# Patient Record
Sex: MALE | URBAN - METROPOLITAN AREA
[De-identification: names, ages, dates, MRNs, and addresses within clinical notes are randomized per-mention and may not be internally consistent; named-entity substitution may affect disease eponyms.]

---

## 2020-08-05 ENCOUNTER — OFFICE VISIT (OUTPATIENT)
Dept: URBAN - METROPOLITAN AREA CLINIC 72 | Facility: CLINIC | Age: 65
End: 2020-08-05
Payer: MEDICARE

## 2020-08-05 DIAGNOSIS — Z96.1 PRESENCE OF INTRAOCULAR LENS: ICD-10-CM

## 2020-08-05 DIAGNOSIS — H43.813 VITREOUS DEGENERATION, BILATERAL: ICD-10-CM

## 2020-08-05 DIAGNOSIS — H25.12 AGE-RELATED NUCLEAR CATARACT, LEFT EYE: Primary | ICD-10-CM

## 2020-08-05 PROCEDURE — 92014 COMPRE OPH EXAM EST PT 1/>: CPT | Performed by: OPTOMETRIST

## 2020-08-05 PROCEDURE — 92133 CPTRZD OPH DX IMG PST SGM ON: CPT | Performed by: OPTOMETRIST

## 2020-08-05 PROCEDURE — 92134 CPTRZ OPH DX IMG PST SGM RTA: CPT | Performed by: OPTOMETRIST

## 2020-08-05 ASSESSMENT — VISUAL ACUITY
OD: 20/20
OS: 20/20

## 2020-08-05 ASSESSMENT — INTRAOCULAR PRESSURE
OS: 9
OD: 7

## 2020-08-05 NOTE — IMPRESSION/PLAN
Impression: Vitreous degeneration, bilateral: H43.813. Plan: The PVD is stable, and there is no evidence of a retinal tear or detachment on dilated exam.  I again reviewed the signs and symptoms of a retinal tear and detachment in detail with the patient, including worsening flashes, new floaters, and development of a shadow/curtain in the peripheral visual field. The patient was advised to call immediately with any changes to [address] or increase in symptoms.

## 2020-08-05 NOTE — IMPRESSION/PLAN
Impression: Age-related nuclear cataract, left eye: H25.12. 

OS still correctable to 20/20. will continue to monitor until vision worsens Plan: Cataracts do not require treatment unless they interfere with vision and impact one's activities of daily living, in which case cataract extraction with lens implant insertion should be performed. Cataracts occur in everyone as they age. Contact office if you experience progressive loss of vision, increasing glare, and problems with daily activities such as driving, reading, watching tv, seeing street signs, or following a golf ball.

## 2021-12-01 ENCOUNTER — OFFICE VISIT (OUTPATIENT)
Dept: URBAN - METROPOLITAN AREA CLINIC 71 | Facility: CLINIC | Age: 66
End: 2021-12-01
Payer: COMMERCIAL

## 2021-12-01 DIAGNOSIS — H52.4 PRESBYOPIA: Primary | ICD-10-CM

## 2021-12-01 PROCEDURE — 92014 COMPRE OPH EXAM EST PT 1/>: CPT | Performed by: OPTOMETRIST

## 2021-12-01 ASSESSMENT — VISUAL ACUITY
OS: 20/20
OD: 20/25

## 2021-12-01 ASSESSMENT — INTRAOCULAR PRESSURE
OD: 10
OS: 8

## 2021-12-01 NOTE — IMPRESSION/PLAN
Impression: Age-related nuclear cataract, left eye: H25.12. Plan: Cataract OS is mild and not interfering with his vision. No treatment currently recommended. The patient will monitor vision changes and contact us with any decrease in vision.

## 2021-12-01 NOTE — IMPRESSION/PLAN
Impression: Presbyopia: H52.4. Plan: Presbyopia is the inability to focus on objects (ie: accommodate) due to the loss of flexibility of your natural lens. Presbyopia occurs with age. Reading glasses, bifocals, trifocals or contacts can be helpful. Contact the office if difficulty focusing persists despite corrective eye wear. New glasses RX given today for flattop bifocals. Slight change in prescription OU. Continue to follow annually for vision exams.

## 2022-07-11 ENCOUNTER — OFFICE VISIT (OUTPATIENT)
Dept: URBAN - METROPOLITAN AREA CLINIC 71 | Facility: CLINIC | Age: 67
End: 2022-07-11
Payer: COMMERCIAL

## 2022-07-11 DIAGNOSIS — Z96.1 PRESENCE OF INTRAOCULAR LENS: ICD-10-CM

## 2022-07-11 DIAGNOSIS — H43.813 VITREOUS DEGENERATION, BILATERAL: ICD-10-CM

## 2022-07-11 DIAGNOSIS — H52.4 PRESBYOPIA: ICD-10-CM

## 2022-07-11 DIAGNOSIS — H53.19 OTHER SUBJECTIVE VISUAL DISTURBANCES: Primary | ICD-10-CM

## 2022-07-11 DIAGNOSIS — H25.12 AGE-RELATED NUCLEAR CATARACT, LEFT EYE: ICD-10-CM

## 2022-07-11 PROCEDURE — 99212 OFFICE O/P EST SF 10 MIN: CPT | Performed by: OPTOMETRIST

## 2022-07-11 PROCEDURE — 92015 DETERMINE REFRACTIVE STATE: CPT | Performed by: OPTOMETRIST

## 2022-07-11 ASSESSMENT — INTRAOCULAR PRESSURE
OD: 7
OS: 7

## 2022-07-11 ASSESSMENT — VISUAL ACUITY: OD: 20/20

## 2022-07-11 NOTE — IMPRESSION/PLAN
Impression: Other subjective visual disturbances: H53.19. Right. Unusual increase in astigmatism OD. Plan: Discussed with pt. Demonstrated ability to improve vision to 20/20- in office today with increased astigmatism. Recommend pt come back for vision exam next available to get updated glasses Rx to see if this helps with his vision complaints.

## 2022-07-11 NOTE — IMPRESSION/PLAN
Impression: Age-related nuclear cataract, left eye: H25.12. Pt was not dilated today. Plan: Continue to monitor.

## 2022-08-15 ENCOUNTER — OFFICE VISIT (OUTPATIENT)
Dept: URBAN - METROPOLITAN AREA CLINIC 71 | Facility: CLINIC | Age: 67
End: 2022-08-15
Payer: COMMERCIAL

## 2022-08-15 DIAGNOSIS — H18.40 CORNEAL DEGENERATION: ICD-10-CM

## 2022-08-15 DIAGNOSIS — H52.4 PRESBYOPIA: Primary | ICD-10-CM

## 2022-08-15 ASSESSMENT — VISUAL ACUITY
OD: 20/30
OS: 20/20

## 2022-08-15 ASSESSMENT — KERATOMETRY
OD: 46.63
OS: 45.38

## 2022-08-15 NOTE — IMPRESSION/PLAN
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 Plan: Discussed in detail with pt. Refer to cornea specialist next available, Dr. Derick Ellis preferred, for further evaluation and to discuss treatment options. Consider cross linking. Pt to call with any questions or concerns.

## 2022-08-15 NOTE — IMPRESSION/PLAN
Impression: Presbyopia: H52.4 Bilateral. iTrace confirms large increase in amount of astigmatism OD. Plan: A glasses prescription has been discussed and generated. Due to large increase in astigmatism, OD modified for adaptation. Adaptation period still expected. Patient to call with any concerns.

## 2022-11-02 ENCOUNTER — OFFICE VISIT (OUTPATIENT)
Dept: URBAN - METROPOLITAN AREA CLINIC 10 | Facility: CLINIC | Age: 67
End: 2022-11-02
Payer: COMMERCIAL

## 2022-11-02 DIAGNOSIS — H17.11 CENTRAL CORNEAL OPACITY, RIGHT EYE: ICD-10-CM

## 2022-11-02 DIAGNOSIS — H25.12 NUCLEAR SCLEROSIS CATARACT OF LEFT EYE: ICD-10-CM

## 2022-11-02 DIAGNOSIS — Z96.1 PRESENCE OF PSEUDOPHAKIA: ICD-10-CM

## 2022-11-02 DIAGNOSIS — H52.221 REGULAR ASTIGMATISM, RIGHT EYE: Primary | ICD-10-CM

## 2022-11-02 PROCEDURE — 99204 OFFICE O/P NEW MOD 45 MIN: CPT | Performed by: OPHTHALMOLOGY

## 2022-11-02 PROCEDURE — 92025 CPTRIZED CORNEAL TOPOGRAPHY: CPT | Performed by: OPHTHALMOLOGY

## 2022-11-02 PROCEDURE — 76514 ECHO EXAM OF EYE THICKNESS: CPT | Performed by: OPHTHALMOLOGY

## 2022-11-02 PROCEDURE — 92286 ANT SGM IMG I&R SPECLR MIC: CPT | Performed by: OPHTHALMOLOGY

## 2022-11-02 ASSESSMENT — INTRAOCULAR PRESSURE
OS: 12
OD: 11

## 2022-11-02 NOTE — IMPRESSION/PLAN
Impression: Regular astigmatism, right eye: H52.221. Plan: Discussed unmasking of corneal astigmatism after cat sx, also likely amblyopia OD as SPH limited to 20/30-20/40 Pt denies use of Toric IOL during cat sx. Options include:  Glassed (discussed risk of anisometropia), CTLs or keratorefractive surgery.   Pt desires to try glasses first.

## 2022-11-02 NOTE — IMPRESSION/PLAN
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 Plan: No treatment currently recommended due to level of vision. Patient will monitor vision changes and contact us with any decrease in vision, will re-evaluate cataract on return visit.